# Patient Record
Sex: FEMALE | Race: BLACK OR AFRICAN AMERICAN | NOT HISPANIC OR LATINO | Employment: FULL TIME | URBAN - METROPOLITAN AREA
[De-identification: names, ages, dates, MRNs, and addresses within clinical notes are randomized per-mention and may not be internally consistent; named-entity substitution may affect disease eponyms.]

---

## 2018-04-19 ENCOUNTER — CONSULT (OUTPATIENT)
Dept: ONCOLOGY | Facility: CLINIC | Age: 33
End: 2018-04-19

## 2018-04-19 ENCOUNTER — LAB (OUTPATIENT)
Dept: LAB | Facility: HOSPITAL | Age: 33
End: 2018-04-19

## 2018-04-19 VITALS
WEIGHT: 133.6 LBS | RESPIRATION RATE: 16 BRPM | BODY MASS INDEX: 24.59 KG/M2 | SYSTOLIC BLOOD PRESSURE: 98 MMHG | TEMPERATURE: 98.4 F | HEART RATE: 81 BPM | DIASTOLIC BLOOD PRESSURE: 60 MMHG | HEIGHT: 62 IN | OXYGEN SATURATION: 99 %

## 2018-04-19 DIAGNOSIS — R53.83 FATIGUE, UNSPECIFIED TYPE: ICD-10-CM

## 2018-04-19 DIAGNOSIS — D69.6 THROMBOCYTOPENIA (HCC): ICD-10-CM

## 2018-04-19 DIAGNOSIS — D70.9 NEUTROPENIA, UNSPECIFIED TYPE (HCC): Primary | ICD-10-CM

## 2018-04-19 DIAGNOSIS — D70.9 NEUTROPENIA, UNSPECIFIED TYPE (HCC): ICD-10-CM

## 2018-04-19 DIAGNOSIS — D69.6 THROMBOCYTOPENIA (HCC): Primary | ICD-10-CM

## 2018-04-19 LAB
BASOPHILS # BLD AUTO: 0.02 10*3/MM3 (ref 0–0.1)
BASOPHILS NFR BLD AUTO: 0.8 % (ref 0–1.1)
CHROMATIN AB SERPL-ACNC: <10 IU/ML (ref 0–14)
DEPRECATED RDW RBC AUTO: 40 FL (ref 37–49)
EOSINOPHIL # BLD AUTO: 0.07 10*3/MM3 (ref 0–0.36)
EOSINOPHIL NFR BLD AUTO: 2.7 % (ref 1–5)
ERYTHROCYTE [DISTWIDTH] IN BLOOD BY AUTOMATED COUNT: 11.9 % (ref 11.7–14.5)
FOLATE SERPL-MCNC: 12.22 NG/ML (ref 4.78–24.2)
HAV IGM SERPL QL IA: NORMAL
HBV CORE IGM SERPL QL IA: NORMAL
HBV SURFACE AG SERPL QL IA: NORMAL
HCT VFR BLD AUTO: 40.9 % (ref 34–45)
HCV AB SER DONR QL: NORMAL
HGB BLD-MCNC: 13.8 G/DL (ref 11.5–14.9)
HIV1 P24 AG SER QL: NORMAL
HIV1+2 AB SER QL: NORMAL
IMM GRANULOCYTES # BLD: 0.01 10*3/MM3 (ref 0–0.03)
IMM GRANULOCYTES NFR BLD: 0.4 % (ref 0–0.5)
LDH SERPL-CCNC: 167 U/L (ref 99–259)
LYMPHOCYTES # BLD AUTO: 1.13 10*3/MM3 (ref 1–3.5)
LYMPHOCYTES NFR BLD AUTO: 44.3 % (ref 20–49)
MCH RBC QN AUTO: 31 PG (ref 27–33)
MCHC RBC AUTO-ENTMCNC: 33.7 G/DL (ref 32–35)
MCV RBC AUTO: 91.9 FL (ref 83–97)
MONOCYTES # BLD AUTO: 0.54 10*3/MM3 (ref 0.25–0.8)
MONOCYTES NFR BLD AUTO: 21.2 % (ref 4–12)
NEUTROPHILS # BLD AUTO: 0.78 10*3/MM3 (ref 1.5–7)
NEUTROPHILS NFR BLD AUTO: 30.6 % (ref 39–75)
NRBC BLD MANUAL-RTO: 0 /100 WBC (ref 0–0)
PLATELET # BLD AUTO: 113 10*3/MM3 (ref 150–375)
PLATELETS.RETICULATED NFR BLD AUTO: 4 % (ref 1.1–6.1)
PMV BLD AUTO: 10.2 FL (ref 8.9–12.1)
RBC # BLD AUTO: 4.45 10*6/MM3 (ref 3.9–5)
VIT B12 BLD-MCNC: 335 PG/ML (ref 211–946)
WBC NRBC COR # BLD: 2.55 10*3/MM3 (ref 4–10)

## 2018-04-19 PROCEDURE — 86431 RHEUMATOID FACTOR QUANT: CPT | Performed by: INTERNAL MEDICINE

## 2018-04-19 PROCEDURE — 85025 COMPLETE CBC W/AUTO DIFF WBC: CPT | Performed by: INTERNAL MEDICINE

## 2018-04-19 PROCEDURE — 87899 AGENT NOS ASSAY W/OPTIC: CPT | Performed by: INTERNAL MEDICINE

## 2018-04-19 PROCEDURE — 36416 COLLJ CAPILLARY BLOOD SPEC: CPT | Performed by: INTERNAL MEDICINE

## 2018-04-19 PROCEDURE — 36415 COLL VENOUS BLD VENIPUNCTURE: CPT | Performed by: INTERNAL MEDICINE

## 2018-04-19 PROCEDURE — 82607 VITAMIN B-12: CPT | Performed by: INTERNAL MEDICINE

## 2018-04-19 PROCEDURE — 99204 OFFICE O/P NEW MOD 45 MIN: CPT | Performed by: INTERNAL MEDICINE

## 2018-04-19 PROCEDURE — G0432 EIA HIV-1/HIV-2 SCREEN: HCPCS | Performed by: INTERNAL MEDICINE

## 2018-04-19 PROCEDURE — 82746 ASSAY OF FOLIC ACID SERUM: CPT | Performed by: INTERNAL MEDICINE

## 2018-04-19 PROCEDURE — 80074 ACUTE HEPATITIS PANEL: CPT | Performed by: INTERNAL MEDICINE

## 2018-04-19 PROCEDURE — 83615 LACTATE (LD) (LDH) ENZYME: CPT | Performed by: INTERNAL MEDICINE

## 2018-04-19 PROCEDURE — 85055 RETICULATED PLATELET ASSAY: CPT | Performed by: INTERNAL MEDICINE

## 2018-04-19 PROCEDURE — 86038 ANTINUCLEAR ANTIBODIES: CPT | Performed by: INTERNAL MEDICINE

## 2018-04-19 RX ORDER — AZITHROMYCIN 500 MG/1
TABLET, FILM COATED ORAL
Refills: 0 | COMMUNITY
Start: 2018-04-17 | End: 2018-05-10

## 2018-04-19 RX ORDER — CLINDAMYCIN PHOSPHATE 100 MG/1
SUPPOSITORY VAGINAL
Refills: 3 | COMMUNITY
Start: 2018-04-13 | End: 2018-05-10

## 2018-04-19 NOTE — PROGRESS NOTES
Subjective     REASON FOR CONSULTATION:  Leukopenia and thrombocytopenia  Provide an opinion on any further workup or treatment                             REQUESTING PHYSICIAN:  Dr. Peña    RECORDS OBTAINED:  Records of the patients history including those obtained from the referring provider were reviewed and summarized in detail.    History of Present Illness   This is a very pleasant 32-year-old  woman seen today for evaluation of leukopenia referred from her obstetrician.  The patient does not have a primary care provider and has not had blood work done in a few years.  She saw Dr. Peña for routine female exam with complaints of irregular menstrual cycles and abnormal vaginal odor.  The patient was diagnosed with Chlamydia and is on antibiotics.  A CBC performed at the day of visit 4/12/18 showed a white blood cell count 2.6, hemoglobin 13.6, platelet 224, absolute neutrophil count 1.3, absolute lymphocyte count 0.7 and otherwise normal differential.  Thyroid studies were performed that the patient reports were normal.    I did find prior CBCs in the [facility] system from 2012 and 2016 both times the patient had normal white blood cell count.  She also had normal platelet counts but was noted to be anemic associated with pregnancy in 2012.  Her mean platelet volume was elevated.    Symptomatically, the patient feels well.  She reports fatigue.  A few months ago she was experiencing some hair loss which seems to have resolved.  She denies weight loss.  She denies lymphadenopathy.  She denies early satiety.  She denies recent infections other than the above-mentioned chlamydia.  She does not think she has been tested for HIV.  She does have history of tattoo placement in someone's home and is unsure about the needle being clean.  She denies history of IV drug abuse.          Past Medical History:   Diagnosis Date   • HPV in female         Past Surgical History:   Procedure Laterality  "Date   • HYSTEROSCOPY  05/2017    IUD removal        No current outpatient prescriptions on file prior to visit.     No current facility-administered medications on file prior to visit.         ALLERGIES:    Allergies   Allergen Reactions   • Aspirin Other (See Comments)     Nose bleeds        Social History     Social History   • Marital status: Single   • Number of children: 2     Occupational History   •       [employer]     Social History Main Topics   • Smoking status: Current Some Day Smoker   • Smokeless tobacco: Never Used   • Alcohol use Yes      Comment: social   • Drug use: No   • Sexual activity: Yes     Other Topics Concern   • Not on file        Family History   Problem Relation Age of Onset   • Mental illness Mother    • Diabetes Maternal Grandfather    • Cancer Maternal Grandfather         Review of Systems   Constitutional: Positive for fatigue. Negative for activity change, appetite change and fever.   HENT: Negative.    Eyes: Negative.    Respiratory: Negative.    Cardiovascular: Negative.    Gastrointestinal: Positive for constipation. Negative for blood in stool and rectal pain.   Genitourinary: Positive for menstrual problem and vaginal discharge. Negative for decreased urine volume.   Musculoskeletal: Negative.    Skin: Negative.    Neurological: Positive for numbness. Negative for tremors, speech difficulty and weakness.   Hematological: Negative.    Psychiatric/Behavioral: Negative.         Objective     Vitals:    04/19/18 0907   BP: 98/60   Pulse: 81   Resp: 16   Temp: 98.4 °F (36.9 °C)   TempSrc: Oral   SpO2: 99%   Weight: 60.6 kg (133 lb 9.6 oz)  Comment: Pt states that her weight in December was 119 lbs   Height: 158 cm (62.21\")   PainSc: 0-No pain     Current Status 4/19/2018   ECOG score 0       Physical Exam    CON: young well-developed black woman  HEENT: no icterus or thrush  NECK: no jvd  LN: no cervical, supraclavicular or axillary lad  CV: RRR, S1S2  RESP: cta bilat, no wheezing  GI: " soft, nontender, no mass, +bs  MS: no edema  SKIN: no petechiae or bruising    RECENT LABS:  Hematology WBC   Date Value Ref Range Status   04/19/2018 2.55 (L) 4.00 - 10.00 10*3/mm3 Final     RBC   Date Value Ref Range Status   04/19/2018 4.45 3.90 - 5.00 10*6/mm3 Final     Hemoglobin   Date Value Ref Range Status   04/19/2018 13.8 11.5 - 14.9 g/dL Final     Hematocrit   Date Value Ref Range Status   04/19/2018 40.9 34.0 - 45.0 % Final     Platelets   Date Value Ref Range Status   04/19/2018 113 (L) 150 - 375 10*3/mm3 Final          Assessment/Plan     This is a 40-year-old woman with leukopenia and thrombocytopenia.  The hemoglobin is normal.  I initially thought the patient may have ethnic neutropenia, but previous labs in the [facility] system show a normal white blood cell count and neutrophil count.  The low platelet count is also new.  I will further evaluate with B12, folic acid, sedimentation rate, LDH, immature platelet fraction, rheumatoid factor, ANDRES, and comprehensive ANDRES, peripheral blood flow cytometry.  The patient has recently been diagnosed with chlamydia and has had tattoos placed and unsure about the needles being clean; I therefore recommended we check HIV and hepatitis profiles.    I will see the patient back in 2-3 weeks to review results.  I will repeat her CBC at that time.  Further testing could be required.

## 2018-04-20 LAB
ANA SER QL: NEGATIVE
CENTROMERE B AB SER-ACNC: <0.2 AI (ref 0–0.9)
CHROMATIN AB SERPL-ACNC: 0.2 AI (ref 0–0.9)
DSDNA AB SER-ACNC: <1 IU/ML (ref 0–9)
ENA JO1 AB SER-ACNC: <0.2 AI (ref 0–0.9)
ENA RNP AB SER-ACNC: 0.3 AI (ref 0–0.9)
ENA SCL70 AB SER-ACNC: <0.2 AI (ref 0–0.9)
ENA SM AB SER-ACNC: <0.2 AI (ref 0–0.9)
ENA SS-A AB SER-ACNC: 2.9 AI (ref 0–0.9)
ENA SS-B AB SER-ACNC: 0.2 AI (ref 0–0.9)
Lab: ABNORMAL

## 2018-04-23 LAB — REF LAB TEST METHOD: NORMAL

## 2018-05-10 ENCOUNTER — OFFICE VISIT (OUTPATIENT)
Dept: ONCOLOGY | Facility: CLINIC | Age: 33
End: 2018-05-10

## 2018-05-10 ENCOUNTER — LAB (OUTPATIENT)
Dept: LAB | Facility: HOSPITAL | Age: 33
End: 2018-05-10

## 2018-05-10 VITALS
RESPIRATION RATE: 14 BRPM | TEMPERATURE: 98.2 F | SYSTOLIC BLOOD PRESSURE: 100 MMHG | WEIGHT: 132.6 LBS | BODY MASS INDEX: 24.4 KG/M2 | OXYGEN SATURATION: 100 % | HEART RATE: 75 BPM | HEIGHT: 62 IN | DIASTOLIC BLOOD PRESSURE: 70 MMHG

## 2018-05-10 DIAGNOSIS — D69.6 THROMBOCYTOPENIA (HCC): Primary | ICD-10-CM

## 2018-05-10 DIAGNOSIS — D70.9 NEUTROPENIA, UNSPECIFIED TYPE (HCC): ICD-10-CM

## 2018-05-10 LAB
BASOPHILS # BLD AUTO: 0.03 10*3/MM3 (ref 0–0.1)
BASOPHILS NFR BLD AUTO: 0.8 % (ref 0–1.1)
DEPRECATED RDW RBC AUTO: 39.9 FL (ref 37–49)
EOSINOPHIL # BLD AUTO: 0.03 10*3/MM3 (ref 0–0.36)
EOSINOPHIL NFR BLD AUTO: 0.8 % (ref 1–5)
ERYTHROCYTE [DISTWIDTH] IN BLOOD BY AUTOMATED COUNT: 11.9 % (ref 11.7–14.5)
HCT VFR BLD AUTO: 41.5 % (ref 34–45)
HGB BLD-MCNC: 14.1 G/DL (ref 11.5–14.9)
IMM GRANULOCYTES # BLD: 0.02 10*3/MM3 (ref 0–0.03)
IMM GRANULOCYTES NFR BLD: 0.5 % (ref 0–0.5)
LYMPHOCYTES # BLD AUTO: 1.37 10*3/MM3 (ref 1–3.5)
LYMPHOCYTES NFR BLD AUTO: 36 % (ref 20–49)
MCH RBC QN AUTO: 31.1 PG (ref 27–33)
MCHC RBC AUTO-ENTMCNC: 34 G/DL (ref 32–35)
MCV RBC AUTO: 91.4 FL (ref 83–97)
MONOCYTES # BLD AUTO: 0.52 10*3/MM3 (ref 0.25–0.8)
MONOCYTES NFR BLD AUTO: 13.6 % (ref 4–12)
NEUTROPHILS # BLD AUTO: 1.84 10*3/MM3 (ref 1.5–7)
NEUTROPHILS NFR BLD AUTO: 48.3 % (ref 39–75)
NRBC BLD MANUAL-RTO: 0 /100 WBC (ref 0–0)
PLATELET # BLD AUTO: 110 10*3/MM3 (ref 150–375)
PMV BLD AUTO: 11.3 FL (ref 8.9–12.1)
RBC # BLD AUTO: 4.54 10*6/MM3 (ref 3.9–5)
WBC NRBC COR # BLD: 3.81 10*3/MM3 (ref 4–10)

## 2018-05-10 PROCEDURE — 85025 COMPLETE CBC W/AUTO DIFF WBC: CPT | Performed by: INTERNAL MEDICINE

## 2018-05-10 PROCEDURE — 36415 COLL VENOUS BLD VENIPUNCTURE: CPT | Performed by: INTERNAL MEDICINE

## 2018-05-10 PROCEDURE — 99214 OFFICE O/P EST MOD 30 MIN: CPT | Performed by: INTERNAL MEDICINE

## 2018-05-10 NOTE — PROGRESS NOTES
Subjective     REASON FOR FOLLOW-UP:  Leukopenia and thrombocytopenia                               REQUESTING PHYSICIAN:  Dr. Peña    RECORDS OBTAINED:  Records of the patients history including those obtained from the referring provider were reviewed and summarized in detail.    History of Present Illness   This is a very pleasant 32-year-old  woman seen today for evaluation of leukopenia referred from her obstetrician.  The patient does not have a primary care provider and has not had blood work done in a few years.  She saw Dr. Peña for routine female exam with complaints of irregular menstrual cycles and abnormal vaginal odor.  The patient was diagnosed with Chlamydia and is on antibiotics.  A CBC performed at the day of visit 4/12/18 showed a white blood cell count 2.6, hemoglobin 13.6, platelet 224, absolute neutrophil count 1.3, absolute lymphocyte count 0.7 and otherwise normal differential.  Thyroid studies were performed that the patient reports were normal.    I did find prior CBCs in the [facility] system from 2012 and 2016 both times the patient had normal white blood cell count.  She also had normal platelet counts but was noted to be anemic associated with pregnancy in 2012.  Her mean platelet volume was elevated.    Symptomatically, the patient feels well.  She reports fatigue.  A few months ago she was experiencing some hair loss which seems to have resolved.  She denies weight loss.  She denies lymphadenopathy.  She denies early satiety.  She denies recent infections other than the above-mentioned chlamydia.  She does not think she has been tested for HIV.  She does have history of tattoo placement in someone's home and is unsure about the needle being clean.  She denies history of IV drug abuse.    At her visit on 4/19/18 a peripheral blood flow cytometry was performed showing no abnormal lymphoid her myeloid populations.  HIV was negative.  An acute hepatitis panel was  negative.  B12 and folic acid levels were normal.  LDH was normal 167.  Rheumatoid factor was less than 10.  An ANDRES was negative but a comprehensive ANDRES showed a positive SSA antibody.      Past Medical History:   Diagnosis Date   • HPV in female         Past Surgical History:   Procedure Laterality Date   • HYSTEROSCOPY  05/2017    IUD removal        Current Outpatient Prescriptions on File Prior to Visit   Medication Sig Dispense Refill   • [DISCONTINUED] azithromycin (ZITHROMAX) 500 MG tablet TK 2 TS PO Q 12 H  0   • [DISCONTINUED] CLEOCIN 100 MG vaginal suppository INSERT 1 SUPPOSITORY VAGINALLY HS FOR 3 NIGHTS  3     No current facility-administered medications on file prior to visit.         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 Negative for activity change, appetite change and fever.   HENT: Negative.    Eyes: Negative.    Respiratory: Negative.    Cardiovascular: Negative.    Gastrointestinal: Positive for constipation. Negative for blood in stool and rectal pain.   Genitourinary: Positive for menstrual problem and vaginal discharge. Negative for decreased urine volume.   Musculoskeletal: Negative.    Skin:  "Negative.    Neurological: Negative for tremors, speech difficulty, weakness and numbness.   Hematological: Negative.    Psychiatric/Behavioral: Negative.     ROS performed and unchanged from above    Objective     Vitals:    05/10/18 1624   BP: 100/70   Pulse: 75   Resp: 14   Temp: 98.2 °F (36.8 °C)   SpO2: 100%  Comment: at rest   Weight: 60.1 kg (132 lb 9.6 oz)   Height: 158 cm (62.21\")   PainSc: 0-No pain     Current Status 5/10/2018   ECOG score 0       Physical Exam    CON: young well-developed black woman  HEENT: no icterus or thrush  NECK: no jvd  LN: no cervical, supraclavicular or axillary lad  CV: RRR, S1S2  RESP: cta bilat, no wheezing  GI: soft, nontender, no mass, +bs  MS: no edema  SKIN: no petechiae or bruising    Exam performed and unchanged from above-5/10/18    RECENT LABS:  Hematology WBC   Date Value Ref Range Status   05/10/2018 3.81 (L) 4.00 - 10.00 10*3/mm3 Final     RBC   Date Value Ref Range Status   05/10/2018 4.54 3.90 - 5.00 10*6/mm3 Final     Hemoglobin   Date Value Ref Range Status   05/10/2018 14.1 11.5 - 14.9 g/dL Final     Hematocrit   Date Value Ref Range Status   05/10/2018 41.5 34.0 - 45.0 % Final     Platelets   Date Value Ref Range Status   05/10/2018 110 (L) 150 - 375 10*3/mm3 Final      See the history of present illness for additional lab results    Assessment/Plan     This is a 40-year-old woman with leukopenia and thrombocytopenia.  The hemoglobin is normal.  Todays CBC improvement in the white blood cell count 3.81 and the MCV is normal 1.84.  The platelets remain mildly low at 110,000.  IPF is 4%, not elevated.    B12 and folic acid levels are normal.  HIV and hepatitis profiles are negative.  Peripheral blood flow cytometry shows no abnormal myeloid or lymphoid populations.  Per his ANDRES shows a positive SSA antibody so her cytopenias may be immune mediated?    I recommended observation for now.  We will repeat her CBC in 3 months.  If her blood counts worsen, a bone " marrow exam will be recommended.  I will also repeat her conference of ANDRES see if there is persistence of the SSA antibody in 6 months.  If so, rheumatology evaluation will be requested.

## 2018-08-22 ENCOUNTER — APPOINTMENT (OUTPATIENT)
Dept: LAB | Facility: HOSPITAL | Age: 33
End: 2018-08-22

## 2018-08-22 ENCOUNTER — OFFICE VISIT (OUTPATIENT)
Dept: ONCOLOGY | Facility: CLINIC | Age: 33
End: 2018-08-22

## 2018-08-22 ENCOUNTER — LAB (OUTPATIENT)
Dept: LAB | Facility: HOSPITAL | Age: 33
End: 2018-08-22

## 2018-08-22 ENCOUNTER — APPOINTMENT (OUTPATIENT)
Dept: ONCOLOGY | Facility: CLINIC | Age: 33
End: 2018-08-22

## 2018-08-22 VITALS
HEIGHT: 62 IN | SYSTOLIC BLOOD PRESSURE: 102 MMHG | HEART RATE: 59 BPM | OXYGEN SATURATION: 99 % | DIASTOLIC BLOOD PRESSURE: 68 MMHG | RESPIRATION RATE: 16 BRPM | TEMPERATURE: 98.9 F | WEIGHT: 133.6 LBS | BODY MASS INDEX: 24.59 KG/M2

## 2018-08-22 DIAGNOSIS — R76.8 ANA POSITIVE: ICD-10-CM

## 2018-08-22 DIAGNOSIS — D69.6 THROMBOCYTOPENIA (HCC): ICD-10-CM

## 2018-08-22 DIAGNOSIS — D70.8 OTHER NEUTROPENIA (HCC): Primary | ICD-10-CM

## 2018-08-22 DIAGNOSIS — D70.9 NEUTROPENIA, UNSPECIFIED TYPE (HCC): ICD-10-CM

## 2018-08-22 LAB
BASOPHILS # BLD AUTO: 0.04 10*3/MM3 (ref 0–0.1)
BASOPHILS NFR BLD AUTO: 1.2 % (ref 0–1.1)
DEPRECATED RDW RBC AUTO: 40.3 FL (ref 37–49)
EOSINOPHIL # BLD AUTO: 0.04 10*3/MM3 (ref 0–0.36)
EOSINOPHIL NFR BLD AUTO: 1.2 % (ref 1–5)
ERYTHROCYTE [DISTWIDTH] IN BLOOD BY AUTOMATED COUNT: 11.9 % (ref 11.7–14.5)
HCT VFR BLD AUTO: 39.6 % (ref 34–45)
HGB BLD-MCNC: 13.5 G/DL (ref 11.5–14.9)
IMM GRANULOCYTES # BLD: 0.02 10*3/MM3 (ref 0–0.03)
IMM GRANULOCYTES NFR BLD: 0.6 % (ref 0–0.5)
LYMPHOCYTES # BLD AUTO: 1.03 10*3/MM3 (ref 1–3.5)
LYMPHOCYTES NFR BLD AUTO: 32 % (ref 20–49)
MCH RBC QN AUTO: 31.3 PG (ref 27–33)
MCHC RBC AUTO-ENTMCNC: 34.1 G/DL (ref 32–35)
MCV RBC AUTO: 91.9 FL (ref 83–97)
MONOCYTES # BLD AUTO: 0.65 10*3/MM3 (ref 0.25–0.8)
MONOCYTES NFR BLD AUTO: 20.2 % (ref 4–12)
NEUTROPHILS # BLD AUTO: 1.44 10*3/MM3 (ref 1.5–7)
NEUTROPHILS NFR BLD AUTO: 44.8 % (ref 39–75)
NRBC BLD MANUAL-RTO: 0.6 /100 WBC (ref 0–0)
PLATELET # BLD AUTO: 169 10*3/MM3 (ref 150–375)
PMV BLD AUTO: 11.1 FL (ref 8.9–12.1)
RBC # BLD AUTO: 4.31 10*6/MM3 (ref 3.9–5)
WBC NRBC COR # BLD: 3.22 10*3/MM3 (ref 4–10)

## 2018-08-22 PROCEDURE — 99213 OFFICE O/P EST LOW 20 MIN: CPT | Performed by: INTERNAL MEDICINE

## 2018-08-22 PROCEDURE — 85025 COMPLETE CBC W/AUTO DIFF WBC: CPT | Performed by: INTERNAL MEDICINE

## 2018-08-22 PROCEDURE — 36415 COLL VENOUS BLD VENIPUNCTURE: CPT | Performed by: INTERNAL MEDICINE

## 2018-08-22 RX ORDER — CLINDAMYCIN HYDROCHLORIDE 300 MG/1
300 CAPSULE ORAL 3 TIMES DAILY
COMMUNITY
Start: 2018-08-21 | End: 2018-08-23

## 2018-08-22 NOTE — PROGRESS NOTES
Subjective     REASON FOR FOLLOW-UP:  Leukopenia and thrombocytopenia                               REQUESTING PHYSICIAN:  Dr. Peña    RECORDS OBTAINED:  Records of the patients history including those obtained from the referring provider were reviewed and summarized in detail.    History of Present Illness   This is a very pleasant 32-year-old  woman seen today for evaluation of leukopenia referred from her obstetrician.  The patient does not have a primary care provider and has not had blood work done in a few years.  She saw Dr. Peña for routine female exam with complaints of irregular menstrual cycles and abnormal vaginal odor.  The patient was diagnosed with Chlamydia and is on antibiotics.  A CBC performed at the day of visit 4/12/18 showed a white blood cell count 2.6, hemoglobin 13.6, platelet 224, absolute neutrophil count 1.3, absolute lymphocyte count 0.7 and otherwise normal differential.  Thyroid studies were performed that the patient reports were normal.    I did find prior CBCs in the [facility] system from 2012 and 2016 both times the patient had normal white blood cell count.  She also had normal platelet counts but was noted to be anemic associated with pregnancy in 2012.  Her mean platelet volume was elevated.    She complains of fatigue.  She denies weight loss.  She denies lymphadenopathy.  She denies early satiety.  She denies recent infections other than the above-mentioned chlamydia.  She does not think she has been tested for HIV.  She does have history of tattoo placement in someone's home and is unsure about the needle being clean.  She denies history of IV drug abuse.    At her visit on 4/19/18 a peripheral blood flow cytometry was performed showing no abnormal lymphoid her myeloid populations.  HIV was negative.  An acute hepatitis panel was negative.  B12 and folic acid levels were normal.  LDH was normal 167.  Rheumatoid factor was less than 10.  An ANDRES was  negative but a comprehensive ANDRES showed a positive SSA antibody.    She returns today for follow-up.  She denies infection.  She does report continued fatigue, concerns about hair loss and dry eyes.      Past Medical History:   Diagnosis Date   • H/O Leukopenia    • H/O Thrombocytopenia (CMS/HCC)    • HPV in female         Past Surgical History:   Procedure Laterality Date   • HYSTEROSCOPY  05/2017    IUD removal        No current outpatient prescriptions on file prior to visit.     No current facility-administered medications on file prior to visit.         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 Negative for activity change, appetite change and fever.   HENT: Negative.    Eyes: Negative.         Dry eyes   Respiratory: Negative.    Cardiovascular: Negative.    Gastrointestinal: Positive for constipation. Negative for blood in stool and rectal pain.   Genitourinary: Positive for menstrual problem and vaginal discharge. Negative for decreased urine volume.   Musculoskeletal: Negative.    Skin:        Planes of hair loss   Neurological: Negative for tremors, speech difficulty, weakness and numbness.   Hematological:  "Negative.    Psychiatric/Behavioral: Negative.     ROS performed and unchanged from above    Objective     Vitals:    08/22/18 1049   BP: 102/68   Pulse: 59   Resp: 16   Temp: 98.9 °F (37.2 °C)   TempSrc: Oral   SpO2: 99%   Weight: 60.6 kg (133 lb 9.6 oz)   Height: 158 cm (62.21\")   PainSc: 0-No pain     Current Status 8/22/2018   ECOG score 0       Physical Exam    CON: young well-developed black woman  HEENT: no icterus or thrush  NECK: no jvd  LN: no cervical, supraclavicular or axillary lad  CV: RRR, S1S2  RESP: cta bilat, no wheezing  GI: soft, nontender, no mass, +bs  MS: no edema  SKIN: no petechiae or bruising    Exam performed and unchanged from above-8/22/18    RECENT LABS:  Hematology WBC   Date Value Ref Range Status   08/22/2018 3.22 (L) 4.00 - 10.00 10*3/mm3 Final     RBC   Date Value Ref Range Status   08/22/2018 4.31 3.90 - 5.00 10*6/mm3 Final     Hemoglobin   Date Value Ref Range Status   08/22/2018 13.5 11.5 - 14.9 g/dL Final     Hematocrit   Date Value Ref Range Status   08/22/2018 39.6 34.0 - 45.0 % Final     Platelets   Date Value Ref Range Status   08/22/2018 169 150 - 375 10*3/mm3 Final      See the history of present illness for additional lab results    Assessment/Plan     This is a 32-year-old woman with leukopenia and thrombocytopenia.  She has normal platelet count today.  She has borderline neutropenia which is asymptomatic with no infectious complications.  The patient complains of fatigue, dry eyes, alopecia and her previous SSA antibody on her comprehensive ANDRES panel.  I will refer her to rheumatology for evaluation.  Continued observation of her blood counts for now with a CBC in 6 months.           "

## 2019-02-07 ENCOUNTER — LAB (OUTPATIENT)
Dept: LAB | Facility: HOSPITAL | Age: 34
End: 2019-02-07

## 2019-02-07 ENCOUNTER — OFFICE VISIT (OUTPATIENT)
Dept: ONCOLOGY | Facility: CLINIC | Age: 34
End: 2019-02-07

## 2019-02-07 VITALS
DIASTOLIC BLOOD PRESSURE: 66 MMHG | RESPIRATION RATE: 16 BRPM | WEIGHT: 129.5 LBS | HEIGHT: 62 IN | BODY MASS INDEX: 23.83 KG/M2 | SYSTOLIC BLOOD PRESSURE: 112 MMHG | TEMPERATURE: 99.2 F | HEART RATE: 68 BPM

## 2019-02-07 DIAGNOSIS — D70.8 OTHER NEUTROPENIA (HCC): ICD-10-CM

## 2019-02-07 DIAGNOSIS — R76.8 ANA POSITIVE: Primary | ICD-10-CM

## 2019-02-07 DIAGNOSIS — D69.6 THROMBOCYTOPENIA (HCC): ICD-10-CM

## 2019-02-07 LAB
BASOPHILS # BLD AUTO: 0.04 10*3/MM3 (ref 0–0.1)
BASOPHILS NFR BLD AUTO: 1.3 % (ref 0–1.1)
DEPRECATED RDW RBC AUTO: 39.8 FL (ref 37–49)
EOSINOPHIL # BLD AUTO: 0.03 10*3/MM3 (ref 0–0.36)
EOSINOPHIL NFR BLD AUTO: 1 % (ref 1–5)
ERYTHROCYTE [DISTWIDTH] IN BLOOD BY AUTOMATED COUNT: 12 % (ref 11.7–14.5)
HCT VFR BLD AUTO: 40.2 % (ref 34–45)
HGB BLD-MCNC: 13.7 G/DL (ref 11.5–14.9)
IMM GRANULOCYTES # BLD AUTO: 0.06 10*3/MM3 (ref 0–0.03)
IMM GRANULOCYTES NFR BLD AUTO: 2 % (ref 0–0.5)
LYMPHOCYTES # BLD AUTO: 1.11 10*3/MM3 (ref 1–3.5)
LYMPHOCYTES NFR BLD AUTO: 37 % (ref 20–49)
MCH RBC QN AUTO: 30.9 PG (ref 27–33)
MCHC RBC AUTO-ENTMCNC: 34.1 G/DL (ref 32–35)
MCV RBC AUTO: 90.5 FL (ref 83–97)
MONOCYTES # BLD AUTO: 0.55 10*3/MM3 (ref 0.25–0.8)
MONOCYTES NFR BLD AUTO: 18.3 % (ref 4–12)
NEUTROPHILS # BLD AUTO: 1.21 10*3/MM3 (ref 1.5–7)
NEUTROPHILS NFR BLD AUTO: 40.4 % (ref 39–75)
NRBC BLD AUTO-RTO: 0 /100 WBC (ref 0–0)
PLATELET # BLD AUTO: 158 10*3/MM3 (ref 150–375)
PMV BLD AUTO: 11.2 FL (ref 8.9–12.1)
RBC # BLD AUTO: 4.44 10*6/MM3 (ref 3.9–5)
WBC NRBC COR # BLD: 3 10*3/MM3 (ref 4–10)

## 2019-02-07 PROCEDURE — 99213 OFFICE O/P EST LOW 20 MIN: CPT | Performed by: INTERNAL MEDICINE

## 2019-02-07 PROCEDURE — 36416 COLLJ CAPILLARY BLOOD SPEC: CPT | Performed by: INTERNAL MEDICINE

## 2019-02-07 PROCEDURE — 85025 COMPLETE CBC W/AUTO DIFF WBC: CPT | Performed by: INTERNAL MEDICINE

## 2019-02-08 NOTE — PROGRESS NOTES
Subjective     REASON FOR FOLLOW-UP:  Leukopenia and thrombocytopenia                               REQUESTING PHYSICIAN:  Dr. Peña    RECORDS OBTAINED:  Records of the patients history including those obtained from the referring provider were reviewed and summarized in detail.    History of Present Illness   This is a very pleasant 32-year-old  woman seen today for evaluation of leukopenia referred from her obstetrician.  The patient does not have a primary care provider and has not had blood work done in a few years.  She saw Dr. Peña for routine female exam with complaints of irregular menstrual cycles and abnormal vaginal odor.  The patient was diagnosed with Chlamydia and is on antibiotics.  A CBC performed at the day of visit 4/12/18 showed a white blood cell count 2.6, hemoglobin 13.6, platelet 224, absolute neutrophil count 1.3, absolute lymphocyte count 0.7 and otherwise normal differential.  Thyroid studies were performed that the patient reports were normal.    I did find prior CBCs in the [facility] system from 2012 and 2016 both times the patient had normal white blood cell count.  She also had normal platelet counts but was noted to be anemic associated with pregnancy in 2012.  Her mean platelet volume was elevated.    She complains of fatigue.  She denies weight loss.  She denies lymphadenopathy.  She denies early satiety.  She denies recent infections other than the above-mentioned chlamydia.  She does not think she has been tested for HIV.  She does have history of tattoo placement in someone's home and is unsure about the needle being clean.  She denies history of IV drug abuse.    At her visit on 4/19/18 a peripheral blood flow cytometry was performed showing no abnormal lymphoid her myeloid populations.  HIV was negative.  An acute hepatitis panel was negative.  B12 and folic acid levels were normal.  LDH was normal 167.  Rheumatoid factor was less than 10.  An ANDRES was  negative but a comprehensive ANDRES showed a positive SSA antibody.    She returns today for follow-up.  She is doing well without infections since her last visit.  She continues to c/o dry eyes, dry mouth and fatigue.  She missed her rheumatology consultation.      Past Medical History:   Diagnosis Date   • H/O Leukopenia    • H/O Thrombocytopenia (CMS/HCC)    • HPV in female         Past Surgical History:   Procedure Laterality Date   • HYSTEROSCOPY  2017    IUD removal        No current outpatient medications on file prior to visit.     No current facility-administered medications on file prior to visit.         ALLERGIES:    Allergies   Allergen Reactions   • Aspirin Other (See Comments)     Nose bleeds        Social History     Socioeconomic History   • Marital status: Single     Spouse name: Not on file   • Number of children: 2   • Years of education: High school   • Highest education level: Not on file   Occupational History   • Occupation: Assembly tech     Comment: [employer]     Employer: [employer]   Tobacco Use   • Smoking status: Former Smoker     Last attempt to quit: 2018     Years since quittin.7   • Smokeless tobacco: Never Used   Substance and Sexual Activity   • Alcohol use: Yes     Comment: social   • Drug use: No   • Sexual activity: Yes        Family History   Problem Relation Age of Onset   • Mental illness Mother    • Diabetes Maternal Grandfather    • Cancer Maternal Grandfather    • Diabetes Maternal Aunt    • Hypertension Maternal Uncle    • Diabetes Maternal Uncle         Review of Systems   Constitutional: Positive for fatigue. Negative for activity change, appetite change and fever.   HENT: Negative.    Eyes: Negative.         Dry eyes   Respiratory: Negative.    Cardiovascular: Negative.    Gastrointestinal: Positive for constipation. Negative for blood in stool and rectal pain.   Genitourinary: Positive for menstrual problem. Negative for decreased urine volume and vaginal  "discharge.   Musculoskeletal: Negative.    Skin:        complains of hair loss   Neurological: Negative for tremors, speech difficulty, weakness and numbness.   Hematological: Negative.    Psychiatric/Behavioral: Negative.        Objective     Vitals:    02/07/19 1104   BP: 112/66   Pulse: 68   Resp: 16   Temp: 99.2 °F (37.3 °C)   TempSrc: Oral   Weight: 58.7 kg (129 lb 8 oz)   Height: 158 cm (62.21\")   PainSc: 0-No pain     Current Status 8/22/2018   ECOG score 0       Physical Exam    CON: young well-developed black woman  HEENT: no icterus or thrush  NECK: no jvd  LN: no cervical, supraclavicular or axillary lad  CV: RRR, S1S2  RESP: cta bilat, no wheezing  GI: soft, nontender, no mass, +bs  MS: no edema  SKIN: no petechiae or bruising    Exam performed and unchanged from above-1/7/19    RECENT LABS:  Hematology WBC   Date Value Ref Range Status   02/07/2019 3.00 (L) 4.00 - 10.00 10*3/mm3 Final     RBC   Date Value Ref Range Status   02/07/2019 4.44 3.90 - 5.00 10*6/mm3 Final     Hemoglobin   Date Value Ref Range Status   02/07/2019 13.7 11.5 - 14.9 g/dL Final     Hematocrit   Date Value Ref Range Status   02/07/2019 40.2 34.0 - 45.0 % Final     Platelets   Date Value Ref Range Status   02/07/2019 158 150 - 375 10*3/mm3 Final          Assessment/Plan     This is a 33-year-old woman with history of leukopenia and intermittent thrombocytopenia.  She has normal platelet count today.  She has borderline neutropenia which is asymptomatic with no infectious complications.  The patient complains of fatigue, dry eyes, alopecia and her previous SSA antibody on her comprehensive ANDRES panel.  She missed her rheumatology consult so I will refer again.  Continued observation of her blood counts for now with a CBC in 12 months.           "